# Patient Record
Sex: FEMALE | Race: WHITE
[De-identification: names, ages, dates, MRNs, and addresses within clinical notes are randomized per-mention and may not be internally consistent; named-entity substitution may affect disease eponyms.]

---

## 2016-08-19 VITALS — SYSTOLIC BLOOD PRESSURE: 149 MMHG | DIASTOLIC BLOOD PRESSURE: 91 MMHG

## 2017-02-27 ENCOUNTER — HOSPITAL ENCOUNTER (OUTPATIENT)
Dept: HOSPITAL 44 - LAB | Age: 82
End: 2017-02-27
Attending: FAMILY MEDICINE
Payer: MEDICARE

## 2017-02-27 DIAGNOSIS — E03.8: Primary | ICD-10-CM

## 2017-02-27 PROCEDURE — 36415 COLL VENOUS BLD VENIPUNCTURE: CPT

## 2017-02-27 PROCEDURE — 84443 ASSAY THYROID STIM HORMONE: CPT

## 2017-03-24 ENCOUNTER — HOSPITAL ENCOUNTER (EMERGENCY)
Dept: HOSPITAL 44 - ED | Age: 82
Discharge: HOME | End: 2017-03-24
Payer: MEDICARE

## 2017-03-24 VITALS
DIASTOLIC BLOOD PRESSURE: 78 MMHG | SYSTOLIC BLOOD PRESSURE: 127 MMHG | SYSTOLIC BLOOD PRESSURE: 127 MMHG | DIASTOLIC BLOOD PRESSURE: 78 MMHG

## 2017-03-24 DIAGNOSIS — R42: Primary | ICD-10-CM

## 2017-03-24 LAB
BASOPHILS NFR BLD: 0.3 % (ref 0–1.5)
EGFR (AFRICAN): > 60
EGFR (NON-AFRICAN): > 60
EOSINOPHIL NFR BLD: 2.9 % (ref 0–6.8)
LYMPHOCYTES # BLD AUTO: 3.2 # K/UL (ref 0.6–4)
MCH RBC QN AUTO: 32 PG (ref 28–34)
MONOCYTES #: 0.4 # K/UL (ref 0–0.9)
MONOCYTES %: 4.5 % (ref 0–11)
NEUTROPHILS #: 4.4 # K/UL (ref 1.4–7.7)

## 2017-03-24 PROCEDURE — S1016 NON-PVC INTRAVENOUS ADMINIST: HCPCS

## 2017-03-24 PROCEDURE — 72125 CT NECK SPINE W/O DYE: CPT

## 2017-03-24 PROCEDURE — 99283 EMERGENCY DEPT VISIT LOW MDM: CPT

## 2017-03-24 PROCEDURE — 80048 BASIC METABOLIC PNL TOTAL CA: CPT

## 2017-03-24 PROCEDURE — 85025 COMPLETE CBC W/AUTO DIFF WBC: CPT

## 2017-03-24 PROCEDURE — 70450 CT HEAD/BRAIN W/O DYE: CPT

## 2017-03-24 PROCEDURE — A9270 NON-COVERED ITEM OR SERVICE: HCPCS

## 2017-03-24 NOTE — ED PHYSICIAN DOCUMENTATION
Dizziness





- HISTORIAN


Historian: patient





- HPI


Stated Complaint: Dizziness, N/V


Chief Complaint: Dizziness


Additional Information: 





acute onset tonight


Timing: sudden onset


Duration: noted on awakening


Severity: moderate


Associated Symptoms: vestibular


Decreased Ability to Stand/ Walk: off balance


Usually: walks w/o assistance


Worsened By: movement of head


Further Comments: no





- ROS


CONST: no problems


EYES/ENT: none


GI/: none


LNMP: post menopausal


MS/SKIN/LYMPH: none.  denies: calf pain, leg pain, neck pain, back pain, leg 

swelling, ankle swelling, rash, swollen glands


NEURO/PSYCH: none


CVS/RESP: none





- PAST HX


Past History: hyperlipidemia, hypertension, other (hypothyroidism, anxiety)


Cardiac Disease: none


Surgeries/Procedures: none


Immunizations: referred to PCP


Allergies/Adverse Reactions: 


 Allergies











Allergy/AdvReac Type Severity Reaction Status Date / Time


 


No Known Allergies Allergy   Verified 03/24/17 17:15














Home Medications: 


 Ambulatory Orders











 Medication  Instructions  Recorded


 


Atorvastatin Calcium 10 mg PO D 08/19/16


 


Levothyroxine Sodium [Levoxyl] 88 mcg PO D 08/19/16


 


Lorazepam [Ativan] 2 mg PO TID 08/19/16


 


Mupirocin Calcium [Mupirocin] 1 appl TOP BID 08/19/16


 


amLODIPine BESYLATE [Norvasc] 5 mg PO D 08/19/16














- SOCIAL HX


Smoking History: non-smoker


Alcohol Use: none


Drug Use: none





- FAMILY HX


Family History: none





- VITAL SIGNS


Vital Signs: 





 Vital Signs











Temp Pulse Resp BP Pulse Ox


 


 97.9 F   79   16   146/76   95 


 


 03/24/17 17:15  03/24/17 17:15  03/24/17 17:15  03/24/17 17:15  03/24/17 17:15














- REVIEWED ASSESSMENTS


Nursing Assessment  Reviewed: Yes


Vitals Reviewed: Yes





Progress





- Results/Orders


Results/Orders: 





cbc, bmp, cxr ordered





- Progress


Progress: 





pt. given 25 mg meclizine p.o. in er





Critical Care Note





- Critical Care Note


Total Time (mins): 0





ED Results Lab/Radiology





- Lab Results


Lab Results: 





 Lab Results











  03/24/17 03/24/17





  17:40 17:40


 


WBC    8.49 K/ul K/ul





    (4.00-12.00) 


 


RBC    4.79 M/ul M/ul





    (3.90-5.20) 


 


Hgb    15.3 g/dL g/dL





    (12.0-16.0) 


 


Hct    43.5 % %





    (34.5-46.5) 


 


MCV    90.8 fl fl





    (80.0-100.0) 


 


MCH    32.0 pg pg





    (28.0-34.0) 


 


MCHC    35.2 g/dL g/dL





    (30.0-36.0) 


 


RDW    13.1 % %





    (11.3-14.3) 


 


Plt Count    192 K/mm3 K/mm3





    (130-400) 


 


Neut % (Auto)    52.4 % %





    (39.0-79.0) 


 


Lymph % (Auto)    38.0 % %





    (16.0-50.0) 


 


Mono % (Auto)    4.5 % %





    (0.0-11.0) 


 


Eos % (Auto)    2.9 % %





    (0.0-6.8) 


 


Baso % (Auto)    0.3 





    (0.0-1.5) 


 


Neut #    4.4 # k/uL # k/uL





    (1.4-7.7) 


 


Lymph #    3.2 # k/uL # k/uL





    (0.6-4.0) 


 


Mono #    0.4 # k/uL # k/uL





    (0.0-0.9) 


 


Eos #    0.2 # k/uL # k/uL





    (0.0-0.6) 


 


Baso #    0.0 # k/uL # k/uL





    (0.0-0.5) 


 


Reactive Lymphs %    2.0 % %





    (0.0-5.0) 


 


Reactive Lymphs #    0.2 # k/uL # k/uL





    (0.0-0.8) 


 


Sodium  140 mmol/L mmol/L  





   (136-145)  


 


Potassium  3.3 mmol/L L mmol/L  





   (3.5-5.0)  


 


Chloride  106 mmol/L mmol/L  





   ()  


 


Carbon Dioxide  27 mmol/L mmol/L  





   (20-32)  


 


BUN  11 mg/dL mg/dL  





   (10-26)  


 


Creatinine  0.8 mg/dL mg/dL  





   (0.4-1.5)  


 


Estimated Creat Clear  70   





   


 


Est GFR ( Amer)  > 60   





  (60 - ) 


 


Est GFR (Non-Af Amer)  > 60   





  (60 - ) 


 


Glucose  109 mg/dL H mg/dL  





   (70-99)  


 


Calcium  10.0 mg/dL mg/dL  





   (8.5-10.5)  














- Radiology


Radiology Impressions: 





ct head and c-spine neg





- Orders


Orders: 





 ED Orders











 Category Date Time Status


 


 CT BRAIN W/O CONTRAST Stat Exams  03/24/17 Completed


 


 CT C-SPINE W/O CONTRAST Stat Exams  03/24/17 Completed


 


 BMP Routine Lab  03/24/17 17:40 Completed


 


 CBC/PLATELET/DIFF Routine Lab  03/24/17 17:40 Completed


 


 Meclizine HCl [Antivert] Med  03/24/17 17:38 Discontinued





 25 mg PO NOW ONE   


 


 Ondansetron HCl Rapdis [Zofran Odt] Med  03/24/17 17:38 Discontinued





 4 mg PO NOW ONE   


 


 Potassium Chloride [Klor-Con 10] Med  03/24/17 19:00 Ordered





 20 meq PO DAILY   














Dizziness Physical Exam





- Physical Exam


General Appearance: no distress


EENT: eye inspection normal, ENT inspection normal, pharynx normal, no signs of 

dehydration, NESSA, no nystagmus, TM's nml


Neck: normal inspection, thyroid normal, supple


Respiratory: no respiratory distress, breath sounds nml, chest non-tender


CVS: reg rate & rhythm, heart sounds normal, equal pulses, no murmur, no gallop

, PMI nml, no JVD, no friction rub


Abdomen: soft, no organomegaly, normal bowel sounds, no abdominal bruit, no 

distension, non-tender


Skin: warm/dry, normal color


Neuro: nml orientation, nml speech, nml cognition, mood/affect nml


Extremities: non-tender, normal range of motion, no evidence of injury, no edema


Cranial: nml as tested, no evidence of acute CVA


Cerebellar: abnml Romberg test


Sensorimotor: motor nml, sensation nml





Discharge


Clincal Impression: 


 Vertigo





Home Medications: 


Ambulatory Orders





Atorvastatin Calcium 10 mg PO D 08/19/16 


Levothyroxine Sodium [Levoxyl] 88 mcg PO D 08/19/16 


Lorazepam [Ativan] 2 mg PO TID 08/19/16 


Mupirocin Calcium [Mupirocin] 1 appl TOP BID 08/19/16 


amLODIPine BESYLATE [Norvasc] 5 mg PO D 08/19/16 








Comments: 





discharged in stable condition with script for meclizine


Condition: Stable


Disposition: 01 HOME, SELF-CARE


Decision to Admit: NO


Decision Time: 19:00

## 2017-03-24 NOTE — DIAGNOSTIC IMAGING REPORT
JALIL HORTA 

[facility]

[address] [facility] [address]

[place], [place]. [address]

 

 

 

 

Report Submission Date: Mar 24, 2017 6:12:16 PM CDT

Patient       Study

Name:   ROMA GILLIAM       Date:   Mar 24, 2017 5:48:58 PM CDT

MRN:   [id number]       Modality Type:   CT\SR

Gender:   F       Description:   CT BRAIN W/O CONTRAST

:   [birth year]       Institution:   [facility]

Physician:   JALIL HORTA

         

 

 

CT Brain without Contrast 



History:VERTIGO SINCE THIS AFTERNOON WITH NAUSEA 



Technique: Transaxial CT was performed without contrast from the skull base to 
the vertex. 





Findings: Scattered bilateral white matter hypodensity is present, consistent 
with gliosis. Mild cerebral atrophy is present. The lateral ventricles are 
mildly dilated. No hemorrhage or edema-producing mass. The fourth ventricle is 
midline. 



There is mucosal thickening within the right maxillary sinus. The. No skull 
fracture. The mastoid air cells are well developed and well aerated. 



Impression: 



1. Mild cerebral atrophy and white matter gliosis, consistent with 
microvascular angiopathy. 



2. No acute cerebral pathology.

 

Electronically signed on Mar 24, 2017 6:12:16 PM CDT by:

Ramos Diez
[facility]

## 2017-03-24 NOTE — DIAGNOSTIC IMAGING REPORT
JALIL HORTA 

[facility]

[address] [facility] [address] [place] [address]. [id number]

 

 

 

 

Report Submission Date: Mar 24, 2017 6:11:16 PM CDT

Patient       Study

Name:   ROMA GILLIAM       Date:   Mar 24, 2017 5:52:11 PM CDT

MRN:   [id number]       Modality Type:   CT\SR

Gender:   F       Description:   CT C-SPINE W/O CONTRAS

:   [birth year]       Institution:   [facility]

Physician:   JALIL HORTA

         

 

 

CT cervical spine without contrast. 



History: VERTIGO SINCE THIS AFTERNOON WITH NAUSEA 



Technique: Transaxial computed tomography images of the cervical spine were 
obtained without the use of intravenous contrast according to standard 
protocol. 



Findings: 



The vertebral heights and vertebral alignments are normal. There is no evidence 
of acute fracture. The odontoid process is intact. 



Intervertebral disc space narrowing at multiple levels most significant at the 
C3/C4, C5/C6 and C6/C7. There is posterior disc bulging throughout the cervical 
spine most prominent at the above mentioned levels. This also bilateral facet 
arthropathy noted. 



There is no paravertebral soft tissue swelling noted. The lung apices are 
clear. 



Impression: 



1. No acute fracture or subluxation. 



2. Multilevel spondylosis

 

Electronically signed on Mar 24, 2017 6:11:16 PM CDT by:

Ramos CRUZ

## 2017-05-01 ENCOUNTER — HOSPITAL ENCOUNTER (OUTPATIENT)
Dept: HOSPITAL 44 - LABRHC | Age: 82
End: 2017-05-01
Attending: FAMILY MEDICINE
Payer: MEDICARE

## 2017-05-01 DIAGNOSIS — R30.0: Primary | ICD-10-CM

## 2017-05-01 PROCEDURE — 87086 URINE CULTURE/COLONY COUNT: CPT

## 2017-05-20 ENCOUNTER — HOSPITAL ENCOUNTER (EMERGENCY)
Dept: HOSPITAL 44 - ED | Age: 82
Discharge: HOME | End: 2017-05-20
Payer: MEDICARE

## 2017-05-20 VITALS — DIASTOLIC BLOOD PRESSURE: 83 MMHG | SYSTOLIC BLOOD PRESSURE: 170 MMHG

## 2017-05-20 DIAGNOSIS — N30.01: Primary | ICD-10-CM

## 2017-05-20 PROCEDURE — 87086 URINE CULTURE/COLONY COUNT: CPT

## 2017-05-20 PROCEDURE — 81002 URINALYSIS NONAUTO W/O SCOPE: CPT

## 2017-05-21 LAB
PH UR STRIP: 7 [PH] (ref 5–8)
RBC UR QL: (no result)
UROBILINOGEN URINE: 0.2 EU (ref 0.2–1)

## 2017-06-23 ENCOUNTER — HOSPITAL ENCOUNTER (EMERGENCY)
Dept: HOSPITAL 44 - ED | Age: 82
Discharge: HOME | End: 2017-06-23
Payer: MEDICARE

## 2017-06-23 VITALS
DIASTOLIC BLOOD PRESSURE: 69 MMHG | DIASTOLIC BLOOD PRESSURE: 69 MMHG | SYSTOLIC BLOOD PRESSURE: 125 MMHG | SYSTOLIC BLOOD PRESSURE: 125 MMHG

## 2017-06-23 DIAGNOSIS — R50.9: Primary | ICD-10-CM

## 2017-06-23 PROCEDURE — 99283 EMERGENCY DEPT VISIT LOW MDM: CPT

## 2017-06-23 NOTE — ED PHYSICIAN DOCUMENTATION
General Adult





- HISTORIAN


Historian: patient, child





- HPI


Stated Complaint: Fever


Chief Complaint: General Adult


Additional Information: 





to ed fr [facility] living hx fever reported to 106 yesterday.  pt now totally 

asymptomatic -= feels good w/o c/o eats sleeps.  pt did have chills yesterday 

but bowels kidneys ambulation fine.  pt did have chills yesterday but her 

reported symptoms and condition was not compatible with fever that high.  had 

lab incl ua sent to [facility].  we called but those results not done yet.

  considering how well we find her and how her and her sons wishes,  we will 

wait for those lab reports before any mediocal chgs are made. we will send her 

back to [facility] w/o innervention.


Timing: gone now


Severity: mild





- ROS


CONST: no problems


EYES/ENT: none


CVS/RESP: none


GI/: none.  denies: abdominal pain, problems urinating, vomiting, nausea, 

diarrhea, black stools


MS/SKIN/LYMPH: none


NEURO/PSYCH: denies: headache, fainting, dizziness, difficulty with speech, 

anxiety, depression





- PAST HX


Past History: kidney stones, other (anxiety recent upper dental extraction with 

same day dental plate insertion-tolerating well w/ tylenol for discomfort)


Allergies/Adverse Reactions: 


 Allergies











Allergy/AdvReac Type Severity Reaction Status Date / Time


 


prednisone Allergy Intermediate  Verified 06/23/17 17:06














Home Medications: 


 Ambulatory Orders











 Medication  Instructions  Recorded


 


Atorvastatin Calcium 10 mg PO HS 08/19/16


 


Levothyroxine Sodium [Levoxyl] 88 mcg PO D 08/19/16


 


Lorazepam [Ativan] 2 mg PO TID 08/19/16


 


Ibuprofen [Motrin Ib] 200 mg PO Q6 PRN 06/23/17


 


Loratadine [Claritin] 10 mg PO DAILY 06/23/17


 


amLODIPine BESYLATE [Norvasc] 5 mg PO 0900 06/23/17














- SOCIAL HX


Smoking History: non-smoker


Alcohol Use: none


Drug Use: none





- FAMILY HX


Family History: No





- VITAL SIGNS


Vital Signs: 





 Vital Signs











Temp Pulse Resp BP Pulse Ox


 


 98.3 F   89   18   119/62   93 


 


 06/23/17 17:00  06/23/17 17:00  06/23/17 17:00  06/23/17 17:30  06/23/17 17:00














- REVIEWED ASSESSMENTS


Nursing Assessment  Reviewed: Yes


Vitals Reviewed: Yes





General Adult Physical Exam





- PHYSICAL EXAM


GENERAL APPEARANCE: no distress


EENT: eye inspection normal


NECK: thyroid normal.  No: thyromegaly, lymphadenopathy, carotid bruit


RESPIRATORY: no resp distress, chest non-tender, breath sounds normal


CVS: reg rate & rhythm, heart sounds normal


ABDOMEN: soft, non-tender


SKIN: warm/dry, normal color.  No: cyanosis, diaphoresis, jaundice


EXTREMITIES: non-tender, normal range of motion (mild varicose veins-no edema 

color skin quite good for age)


NEURO: oriented X3, motor nml, sensation nml, mood/affect nml





Discharge


Clincal Impression: 


 hx fever, full upper dental extraction w new denta





Referrals: 


Tash Goff MD [Primary Care Provider] - 2 Days


Home Medications: 


Ambulatory Orders





Atorvastatin Calcium 10 mg PO HS 08/19/16 


Levothyroxine Sodium [Levoxyl] 88 mcg PO D 08/19/16 


Lorazepam [Ativan] 2 mg PO TID 08/19/16 


Ibuprofen [Motrin Ib] 200 mg PO Q6 PRN 06/23/17 


Loratadine [Claritin] 10 mg PO DAILY 06/23/17 


amLODIPine BESYLATE [Norvasc] 5 mg PO 0900 06/23/17 








Comments: 





await  lab from today resopond accordingly pluss eval and tx symptome if present


Condition: Good


Disposition: 01 HOME, SELF-CARE


Decision to Admit: NO


Decision Time: 18:30

## 2017-08-31 ENCOUNTER — HOSPITAL ENCOUNTER (OUTPATIENT)
Dept: HOSPITAL 44 - LAB | Age: 82
End: 2017-08-31
Attending: FAMILY MEDICINE
Payer: MEDICARE

## 2017-08-31 DIAGNOSIS — E78.2: Primary | ICD-10-CM

## 2017-08-31 LAB
EGFR (AFRICAN): > 60
EGFR (NON-AFRICAN): > 60

## 2017-08-31 PROCEDURE — 80061 LIPID PANEL: CPT

## 2017-08-31 PROCEDURE — 36415 COLL VENOUS BLD VENIPUNCTURE: CPT

## 2017-08-31 PROCEDURE — 80053 COMPREHEN METABOLIC PANEL: CPT

## 2018-07-26 NOTE — ED PHYSICIAN DOCUMENTATION
"Occupational Therapy Evaluation completed.   Plan of Care: Patient with no further skilled OT needs in the acute care setting at this time  Discharge Recommendations:  Equipment: No Equipment Needed. Post-acute therapy Currently anticipate no further skilled therapy needs once patient is discharged from the inpatient setting.    Patient presents and reports at / near functional baseline necessitating I with ADLs and mobility with no AE. Patient has no further skilled OT needs in this setting at this time. Eval only. DC OT.     See \"Rehab Therapy-Acute\" Patient Summary Report for complete documentation.    " Female Urogenital Problems





- HISTORIAN


Historian: patient





- HPI


Chief Complaint: Female Urogenital Problems


Onset: hours


Further Comments: yes (84 year old female patient presents with dysuria and 

lower abd pain which started this afternoon.)





- Associated Symptoms


Urinary Symptoms: frequent urination, burning w/ urination, urgency w/ urination

, pain w/ urination





- ROS


CONST: recent illness (5/1/17 UTI)


GI/: denies: nausea, vomiting, decreased appetite, diarrhea, black stools, 

bloody stools, other


CVS/RESP: none


EYES/ENT: none


NEURO/PSYCH: none


MS/SKIN/LYMPH: none





- PAST HX


Past History: other (hypothyroidism)


Allergies/Adverse Reactions: 


 Allergies











Allergy/AdvReac Type Severity Reaction Status Date / Time


 


prednisone Allergy   Verified 05/20/17 19:04














Home Medications: 


 Ambulatory Orders











 Medication  Instructions  Recorded


 


Atorvastatin Calcium 10 mg PO D 08/19/16


 


Levothyroxine Sodium [Levoxyl] 88 mcg PO D 08/19/16


 


Lorazepam [Ativan] 2 mg PO TID 08/19/16


 


Ciprofloxacin HCl [Cipro] 500 mg PO BID #14 tablet 05/20/17


 


Phenazopyridine HCl [Pyridium] 200 mg PO TID #6 tablet 05/20/17














- SOCIAL HX


Smoking History: non-smoker





- FAMILY HX


Family History: denies: none





- VITAL SIGNS


Vital Signs: 





 Vital Signs











Temp Pulse Resp BP Pulse Ox


 


          127/78    


 


          03/24/17 19:26   














- REVIEWED ASSESSMENTS


Nursing Assessment  Reviewed: Yes


Vitals Reviewed: Yes





Progress





- Progress


Progress: 





UA with 3+ leukocytes, negative nitrates.  Will treat like UTI due to symptoms, 

culture sent. 





ED Results Lab/Radiology





- Orders


Orders: 





 ED Orders











 Category Date Time Status


 


 UA W/MICRO IF INDICATED Stat Lab  05/20/17 18:54 Ordered














Female Urogenital Problems





- EXAM


General Appearance: mild distress


EENT: eye inspection normal, NESSA


Respiratory: no resp. distress, breath sounds nml


CVS: reg rate & rhythm, heart sounds normal, equal pulses, no murmur, no gallop

, PMI nml, no JVD, no friction rub, 24


Abdomen: soft, non-tender, no organomegaly, no distention, nml bowel sounds, 

tenderness (supra pubic area)


Skin: color nml, no rash, warm,dry


Extremities: non-tender, normal range of motion, no evidence of injury, no edema

, J, NP


Neuro: oriented X3, CN's nml as tested, motor nml, sensation nml, mood/affect 

nml





Discharge


Clincal Impression: 


UTI (urinary tract infection)


Qualifiers:


 Urinary tract infection type: acute cystitis Hematuria presence: with 

hematuria Qualified Code(s): N30.01 - Acute cystitis with hematuria





Prescriptions: 


Ciprofloxacin HCl [Cipro] 500 mg PO BID #14 tablet


Phenazopyridine HCl [Pyridium] 200 mg PO TID #6 tablet


Referrals: 


Tash Goff MD [Primary Care Provider] - 2 Days


Additional Instructions: 


 your prescription and start it today


Drink at least 64 oz of water daily.


Avoid caffeinated beverages





Cranberry juice will help with symptoms.





Tylenol every 4 hours as needed for pain/fever or ibuprofen every 6 hours as 

needed for pain and fever


See your primary care provider for a repeat UA 48 hours after completing your 

antibiotic.


Home Medications: 


Ambulatory Orders





Atorvastatin Calcium 10 mg PO D 08/19/16 


Levothyroxine Sodium [Levoxyl] 88 mcg PO D 08/19/16 


Lorazepam [Ativan] 2 mg PO TID 08/19/16 


Ciprofloxacin HCl [Cipro] 500 mg PO BID #14 tablet 05/20/17 


Phenazopyridine HCl [Pyridium] 200 mg PO TID #6 tablet 05/20/17 








Condition: Stable


Disposition: 01 HOME, SELF-CARE


Decision to Admit: NO


Decision Time: 19:15